# Patient Record
Sex: MALE | Race: BLACK OR AFRICAN AMERICAN | NOT HISPANIC OR LATINO | Employment: OTHER | URBAN - METROPOLITAN AREA
[De-identification: names, ages, dates, MRNs, and addresses within clinical notes are randomized per-mention and may not be internally consistent; named-entity substitution may affect disease eponyms.]

---

## 2018-01-08 ENCOUNTER — PES CALL (OUTPATIENT)
Dept: ADMINISTRATIVE | Facility: CLINIC | Age: 78
End: 2018-01-08

## 2018-02-19 DIAGNOSIS — M25.562 LEFT KNEE PAIN: Primary | ICD-10-CM

## 2018-02-28 ENCOUNTER — CLINICAL SUPPORT (OUTPATIENT)
Dept: REHABILITATION | Facility: HOSPITAL | Age: 78
End: 2018-02-28
Payer: MEDICARE

## 2018-02-28 DIAGNOSIS — M25.662 DECREASED ROM OF LEFT KNEE: ICD-10-CM

## 2018-02-28 DIAGNOSIS — M62.89 MUSCLE TIGHTNESS: ICD-10-CM

## 2018-02-28 DIAGNOSIS — M62.81 MUSCLE WEAKNESS: ICD-10-CM

## 2018-02-28 DIAGNOSIS — G89.29 CHRONIC PAIN OF LEFT KNEE: ICD-10-CM

## 2018-02-28 DIAGNOSIS — M25.562 CHRONIC PAIN OF LEFT KNEE: ICD-10-CM

## 2018-02-28 PROCEDURE — G8978 MOBILITY CURRENT STATUS: HCPCS | Mod: CL,PN

## 2018-02-28 PROCEDURE — 97161 PT EVAL LOW COMPLEX 20 MIN: CPT | Mod: PN

## 2018-02-28 PROCEDURE — G8979 MOBILITY GOAL STATUS: HCPCS | Mod: CK,PN

## 2018-02-28 PROCEDURE — 97110 THERAPEUTIC EXERCISES: CPT | Mod: PN

## 2018-02-28 NOTE — PROGRESS NOTES
"  TIME RECORD    Date: 02/28/2018    Start Time:  0830  Stop Time:  0915    OUTPATIENT PHYSICAL THERAPY   PATIENT EVALUATION  Primary Diagnosis:   1. Chronic pain of left knee     2. Muscle weakness     3. Muscle tightness     4. Decreased ROM of left knee       Treatment Diagnosis: decreased ROM of L knee, weakness of L knee, muscle tightness, L knee pain  Past Medical History:   Diagnosis Date    Cancer      Precautions: standard - hx of prostate cancer  Prior Therapy: for shoulder  Medications: Pablo Salcedo currently has no medications in their medication list.  Nutrition:  Normal  History of Present Illness: Pt reporting about 6 months of L knee pain. Pt was told he has bone on bone with some bone spurs within the knee. Pt reports gradual worsening of L knee pain without known cause of pain.   Prior Level of Function: Independent  Social History: retired - likes to go fishing, light walking  Place of Residence (Steps/Adaptations): lives with wife in a 1 story - 2 steps to enter  Functional Deficits Leading to Referral/Nature of Injury: difficulty squatting, decreased ROm of L knee, walking for long periods of time, sit to stand transitions.  Patient Therapy Goals: "more mobile with the L leg, and be able to do more activities"    Subjective     Pablo Salcedo states his pain is intermittent. Pt reports no referral pain to the leg. Pt denies being woken up from pain at night. Pt denies drop foot, change of BB control, unexplained weight gain/loss, fever, chills or night sweats. Pt reports he had an injection within his knee that gave him mild relief for a few days.       Pain:  Location: medial aspect of the L knee   Description: Aching, Throbbing and feels like it is going to give out  Activities Which Increase Pain: squatting, bending, and walking  Activities Which Decrease Pain: neoprene sleeve, ice pacl  Pain Scale: 0/10 at best 2/10 now  8/10 at worst    Objective     Observation/posture: pt in standing " with level pelvis and BLE  Knee   Right    Left   Pain/Dysfunction with Movement     AROM  PROM  MMT  AROM  PROM  MMT     Flexion  140  5 125* 129* 4*    Extension  Hyper 7  4+ Hyper 5  4*        Functional strength:    Pt able to perform full squat - mild limitations in bilateral heel cords - weight shift to R at end range      Sensation/Reflexes: grossly intact to light touch throughout BLE  Palpation: TTP over medial joint line and pes anserine region - hypertonic bands within VMO(and tenderness), vastus lateralis, and Rectus femoris  Flexibility:     HS   Mild limitations LLE - WNL RLE              Rectus femoris : positive jacob bilaterally                  hip flexors: positive ely bilaterally  Tests:            v/v : negative            ant drawer : negative                Tamara test : negative - however - palpable gentle snap or resistance of snapping in medial joint         patellar mobility : very mild limitation in medial mobility          Patellar tracking : WNL bilaterally           SLS: x5 seconds LLE, x15 second RLE - fatigue of hip flexors within last 5 seconds causing hip drop  Hip screen: ROM WNL    R L   Flex 4 4-   Abd 4+ 3+   Add 4- 3+   Ext 4- 4+  Ankle screen: ROM and strength WNL bilaterally  Gait: mild antalgic gait with initiation of ambulation - normalized after 10 feet - no AD    FOTO: 62%   Current G-Code:  CL 60-80%  Goal G-Code:  CK 40-60%    today's treatment x15 minutes:  education: educated in evaluation findings and POC.  Educated in expectations of treatment, provider's contact information (email and phone) given to pt for communication of any questions and concerns.  HEP instruction and ther ex: instructed and performed following:   Seated LAQ x10 3 second holds   HL bridging x10   HL hip adduction squeeze x10 3 second holds   SL clam x10 ea.     Assessment       Initial Assessment (Pertinent finding, problem list and factors affecting outcome):  Pablo Salcedo is  a 76 y/o male referred to outpatient PT for L knee pain. Pt presenting with decreased ROM of L knee, weakness of LLE, limitations in muscle length of BLE, and balance limitations. Pt signs and symptoms consistent with referring diagnosis likely secondary to muscle weakness and decreased NM control. Pt would benefit from skilled PT to address above stated problems, as well as, achieve pt goals within a timely manner. Pt has set realistic goals and has verbalized good understanding and agreement with reported diagnosis, prognosis and treatment. Pt demonstrates no additional cultural, spiritual or educational need and currently has no barriers to learning.    History  Co-morbidities and personal factors that may impact the plan of care Examination  Body Structures and Functions, activity limitations and participation restrictions that may impact the plan of care    Clinical Presentation   Co-morbidities:   none        Personal Factors:   no deficits Body Regions:   lower extremities  trunk    Body Systems:    gross symmetry  ROM  strength  gross coordinated movement  balance  gait  transfers            Participation Restrictions:   Domestic, recreational, community     Activity limitations:   Learning and applying knowledge  no deficits    General Tasks and Commands  no deficits    Communication  no deficits    Mobility  walking    Self care  no deficits    Domestic Life  shopping  cooking  doing house work (cleaning house, washing dishes, laundry)    Interactions/Relationships  no deficits    Life Areas  no deficits    Community and Social Life  community life  recreation and leisure         stable and uncomplicated                      low   low  moderate Decision Making/ Complexity Score:  low       Rehab Potiential: good    Short Term Goals (4 weeks)  1. Pt will demonstrate improvements in L knee ROM to 5-0-130 for ease with ambulation  2. Pt will demonstrate improvements in L knee strength to 4/5 for ease with  getting out of a chair.  3. Pt will be able to ambulate 200 ft with LRAD and without the presence of antalgic gait pattern  4. Pt will report <2/10 pain within the L knee for ease with ADL's  Long Term Goals (8 weeks)  1. Pt will demonstrate improvements in L knee ROM to 5-0-135 for ease with ambulation  2. Pt will demonstrate 4+/5 strength within the L knee for ease with house hold chores and climbing stairs  3. Pt will report being independent with his/her HEP for maintenance of improvements gained during therapy sessions  4. Pt will report <1/10 pain within the L knee for ease with ADLs  5. Pt will demonstrate ambulation x 300 ft without AD or antalgic gait.   6. Pt will score <60% limitation on the FOTO for ease with return to participation within the community      Plan     Certification Period: 2/28/18 to 5/28/18  Recommended Treatment Plan: 2 times per week for 8 weeks: Electrical Stimulation PRN, Iontophoresis (with dexamethasone PRN), Manual Therapy, Moist Heat/ Ice, Neuromuscular Re-ed, Patient Education, Therapeutic Activites, Therapeutic Exercise and Other therapeutic taping, dry needling, aquatic therapy    Other Recommendations: PTA to be involved in pt POC.        Therapist: Judi Patel, PT    I CERTIFY THE NEED FOR THESE SERVICES FURNISHED UNDER THIS PLAN OF TREATMENT AND WHILE UNDER MY CARE    Physician's comments: ________________________________________________________________________________________________________________________________________________      Physician's Name: ___________________________________

## 2023-08-10 ENCOUNTER — OFFICE VISIT (OUTPATIENT)
Dept: CARDIOLOGY | Facility: CLINIC | Age: 83
End: 2023-08-10
Payer: MEDICARE

## 2023-08-10 VITALS
WEIGHT: 198.94 LBS | HEIGHT: 68 IN | SYSTOLIC BLOOD PRESSURE: 130 MMHG | RESPIRATION RATE: 18 BRPM | HEART RATE: 70 BPM | BODY MASS INDEX: 30.15 KG/M2 | OXYGEN SATURATION: 99 % | DIASTOLIC BLOOD PRESSURE: 72 MMHG

## 2023-08-10 DIAGNOSIS — Z01.810 PREOP CARDIOVASCULAR EXAM: Primary | ICD-10-CM

## 2023-08-10 DIAGNOSIS — Z01.818 PREOPERATIVE CLEARANCE: ICD-10-CM

## 2023-08-10 PROCEDURE — 1159F PR MEDICATION LIST DOCUMENTED IN MEDICAL RECORD: ICD-10-PCS | Mod: CPTII,S$GLB,, | Performed by: INTERNAL MEDICINE

## 2023-08-10 PROCEDURE — 1101F PT FALLS ASSESS-DOCD LE1/YR: CPT | Mod: CPTII,S$GLB,, | Performed by: INTERNAL MEDICINE

## 2023-08-10 PROCEDURE — 1160F PR REVIEW ALL MEDS BY PRESCRIBER/CLIN PHARMACIST DOCUMENTED: ICD-10-PCS | Mod: CPTII,S$GLB,, | Performed by: INTERNAL MEDICINE

## 2023-08-10 PROCEDURE — 99203 PR OFFICE/OUTPT VISIT, NEW, LEVL III, 30-44 MIN: ICD-10-PCS | Mod: 25,S$GLB,, | Performed by: INTERNAL MEDICINE

## 2023-08-10 PROCEDURE — 99999 PR PBB SHADOW E&M-NEW PATIENT-LVL III: CPT | Mod: PBBFAC,,, | Performed by: INTERNAL MEDICINE

## 2023-08-10 PROCEDURE — 1125F AMNT PAIN NOTED PAIN PRSNT: CPT | Mod: CPTII,S$GLB,, | Performed by: INTERNAL MEDICINE

## 2023-08-10 PROCEDURE — 1160F RVW MEDS BY RX/DR IN RCRD: CPT | Mod: CPTII,S$GLB,, | Performed by: INTERNAL MEDICINE

## 2023-08-10 PROCEDURE — 3078F PR MOST RECENT DIASTOLIC BLOOD PRESSURE < 80 MM HG: ICD-10-PCS | Mod: CPTII,S$GLB,, | Performed by: INTERNAL MEDICINE

## 2023-08-10 PROCEDURE — 1159F MED LIST DOCD IN RCRD: CPT | Mod: CPTII,S$GLB,, | Performed by: INTERNAL MEDICINE

## 2023-08-10 PROCEDURE — 93000 ELECTROCARDIOGRAM COMPLETE: CPT | Mod: S$GLB,,, | Performed by: INTERNAL MEDICINE

## 2023-08-10 PROCEDURE — 99203 OFFICE O/P NEW LOW 30 MIN: CPT | Mod: 25,S$GLB,, | Performed by: INTERNAL MEDICINE

## 2023-08-10 PROCEDURE — 99999 PR PBB SHADOW E&M-NEW PATIENT-LVL III: ICD-10-PCS | Mod: PBBFAC,,, | Performed by: INTERNAL MEDICINE

## 2023-08-10 PROCEDURE — 3075F SYST BP GE 130 - 139MM HG: CPT | Mod: CPTII,S$GLB,, | Performed by: INTERNAL MEDICINE

## 2023-08-10 PROCEDURE — 1101F PR PT FALLS ASSESS DOC 0-1 FALLS W/OUT INJ PAST YR: ICD-10-PCS | Mod: CPTII,S$GLB,, | Performed by: INTERNAL MEDICINE

## 2023-08-10 PROCEDURE — 3078F DIAST BP <80 MM HG: CPT | Mod: CPTII,S$GLB,, | Performed by: INTERNAL MEDICINE

## 2023-08-10 PROCEDURE — 3075F PR MOST RECENT SYSTOLIC BLOOD PRESS GE 130-139MM HG: ICD-10-PCS | Mod: CPTII,S$GLB,, | Performed by: INTERNAL MEDICINE

## 2023-08-10 PROCEDURE — 3288F PR FALLS RISK ASSESSMENT DOCUMENTED: ICD-10-PCS | Mod: CPTII,S$GLB,, | Performed by: INTERNAL MEDICINE

## 2023-08-10 PROCEDURE — 1125F PR PAIN SEVERITY QUANTIFIED, PAIN PRESENT: ICD-10-PCS | Mod: CPTII,S$GLB,, | Performed by: INTERNAL MEDICINE

## 2023-08-10 PROCEDURE — 93000 EKG 12-LEAD: ICD-10-PCS | Mod: S$GLB,,, | Performed by: INTERNAL MEDICINE

## 2023-08-10 PROCEDURE — 3288F FALL RISK ASSESSMENT DOCD: CPT | Mod: CPTII,S$GLB,, | Performed by: INTERNAL MEDICINE

## 2023-08-10 RX ORDER — OXYBUTYNIN CHLORIDE 5 MG/1
TABLET ORAL
COMMUNITY
Start: 2023-06-09

## 2023-08-10 RX ORDER — MULTIVITAMIN
1 TABLET ORAL DAILY
COMMUNITY

## 2023-08-10 NOTE — PROGRESS NOTES
CARDIOVASCULAR CONSULTATION    REASON FOR CONSULT:   Pablo Salcedo is a 82 y.o. male who presents for preop CV eval.    PCP: Thao Silverman/Ortho: [facility]  HISTORY OF PRESENT ILLNESS:   The patient comes in today at the request of his orthopedic surgeon for preoperative cardiac risk stratification prior to left knee surgery.  He is most limited by left knee pain with ambulation.  He denies any angina, dyspnea, palpitations, or syncope.  There has been no PND, orthopnea, lower extremity edema.  Denies melena, hematuria, or claudication symptoms.      Family history is negative for premature CAD.      The patient is a nonsmoker.  He denies alcohol excess or illicit drug use.  He worked [employer], retired over 20 years ago.    I took the liberty of exercise the patient in the office today and he was able to climb up a flight of stairs without any cardiovascular symptoms or limitations.  He was most limited by left knee discomfort.    CARDIOVASCULAR HISTORY:   none    PAST MEDICAL HISTORY:     Past Medical History:   Diagnosis Date    Cancer        PAST SURGICAL HISTORY:     Past Surgical History:   Procedure Laterality Date    PROSTATE SURGERY         ALLERGIES AND MEDICATION:   Review of patient's allergies indicates:  No Known Allergies     Medication List      as of August 10, 2023  3:12 PM     You have not been prescribed any medications.         SOCIAL HISTORY:     Social History     Socioeconomic History    Marital status:        FAMILY HISTORY:   No family history on file.    REVIEW OF SYSTEMS:   Review of Systems   Constitutional:  Negative for chills, diaphoresis and fever.   HENT:  Negative for nosebleeds.    Eyes:  Negative for blurred vision, double vision and photophobia.   Respiratory:  Negative for hemoptysis, shortness of breath and wheezing.    Cardiovascular:  Negative for chest pain, palpitations, orthopnea, claudication, leg swelling and PND.   Gastrointestinal:  Negative for abdominal pain, blood  in stool, heartburn, melena, nausea and vomiting.   Genitourinary:  Negative for flank pain and hematuria.   Musculoskeletal:  Positive for joint pain (L knee). Negative for falls, myalgias and neck pain.   Skin:  Negative for rash.   Neurological:  Negative for dizziness, seizures, loss of consciousness, weakness and headaches.   Endo/Heme/Allergies:  Negative for polydipsia. Does not bruise/bleed easily.   Psychiatric/Behavioral:  Negative for depression and memory loss. The patient is not nervous/anxious.        PHYSICAL EXAM:   There were no vitals filed for this visit. There is no height or weight on file to calculate BMI.            Physical Exam  Vitals reviewed.   Constitutional:       General: He is not in acute distress.     Appearance: Normal appearance. He is well-developed and normal weight. He is not ill-appearing, toxic-appearing or diaphoretic.   HENT:      Head: Normocephalic and atraumatic.   Eyes:      General: No scleral icterus.     Extraocular Movements: Extraocular movements intact.      Conjunctiva/sclera: Conjunctivae normal.      Pupils: Pupils are equal, round, and reactive to light.   Neck:      Thyroid: No thyromegaly.      Vascular: Normal carotid pulses. No carotid bruit or JVD.      Trachea: Trachea normal.   Cardiovascular:      Rate and Rhythm: Normal rate and regular rhythm.      Pulses:           Carotid pulses are 2+ on the right side and 2+ on the left side.     Heart sounds: S1 normal and S2 normal. No murmur heard.     No friction rub. No gallop.   Pulmonary:      Effort: Pulmonary effort is normal. No respiratory distress.      Breath sounds: Normal breath sounds. No stridor. No wheezing, rhonchi or rales.   Chest:      Chest wall: No tenderness.   Abdominal:      General: There is no distension.      Palpations: Abdomen is soft.   Musculoskeletal:         General: No swelling or tenderness.      Cervical back: Normal range of motion and neck supple. No edema or rigidity.       Right lower leg: No edema.      Left lower leg: No edema.   Feet:      Right foot:      Skin integrity: No ulcer.      Left foot:      Skin integrity: No ulcer.   Skin:     General: Skin is warm and dry.      Coloration: Skin is not jaundiced.   Neurological:      General: No focal deficit present.      Mental Status: He is alert and oriented to person, place, and time.      Cranial Nerves: No cranial nerve deficit.   Psychiatric:         Mood and Affect: Mood normal.         Speech: Speech normal.         Behavior: Behavior normal. Behavior is cooperative.         DATA:   EKG: (personally reviewed tracing)  8/10/23 SR 72    Laboratory:  CBC:        CHEMISTRIES:  Recent Labs   Lab 06/07/23  1254   Sodium 135 L   Potassium 4.3   BUN 12.3   Creatinine 1.01   Calcium 9.1       CARDIAC BIOMARKERS:        COAGS:        LIPIDS/LFTS:        Cardiovascular Testing:  none    ASSESSMENT:   # preop CV eval prior to L knee surgery.  The patient demonstrated good exercise capacity in the office today.    PLAN:   The patient is at low cardiac risk for the planned orthopedic surgery and associated anesthesia.  No further preoperative cardiac testing is required.  RTC prn.    Manohar Faye MD, FACC

## 2025-07-31 ENCOUNTER — HOSPITAL ENCOUNTER (EMERGENCY)
Facility: HOSPITAL | Age: 85
Discharge: HOME | End: 2025-08-01
Attending: INTERNAL MEDICINE
Payer: MEDICARE
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
## 2025-07-31 DIAGNOSIS — S30.0XXA CONTUSION OF COCCYX, INITIAL ENCOUNTER: Primary | ICD-10-CM

## 2025-07-31 DIAGNOSIS — S30.0XXA CONTUSION OF SACRUM, INITIAL ENCOUNTER: ICD-10-CM

## 2025-07-31 PROCEDURE — 99284 EMERGENCY DEPT VISIT MOD MDM: CPT | Mod: 25,ER

## 2025-07-31 PROCEDURE — 25000003 PHARM REV CODE 250: Mod: ER | Performed by: INTERNAL MEDICINE

## 2025-07-31 RX ORDER — IBUPROFEN 600 MG/1
600 TABLET, FILM COATED ORAL
Status: COMPLETED | OUTPATIENT
Start: 2025-07-31 | End: 2025-07-31

## 2025-07-31 RX ORDER — ACETAMINOPHEN 500 MG
1000 TABLET ORAL
Status: COMPLETED | OUTPATIENT
Start: 2025-07-31 | End: 2025-07-31

## 2025-07-31 RX ORDER — HYDROCODONE BITARTRATE AND ACETAMINOPHEN 5; 325 MG/1; MG/1
1 TABLET ORAL EVERY 6 HOURS PRN
Qty: 10 TABLET | Refills: 0 | Status: SHIPPED | OUTPATIENT
Start: 2025-07-31

## 2025-07-31 RX ORDER — ONDANSETRON 4 MG/1
4 TABLET, ORALLY DISINTEGRATING ORAL EVERY 8 HOURS PRN
Qty: 10 TABLET | Refills: 0 | Status: SHIPPED | OUTPATIENT
Start: 2025-07-31

## 2025-07-31 RX ORDER — IBUPROFEN 600 MG/1
600 TABLET, FILM COATED ORAL EVERY 8 HOURS PRN
Qty: 30 TABLET | Refills: 0 | Status: SHIPPED | OUTPATIENT
Start: 2025-07-31

## 2025-07-31 RX ADMIN — IBUPROFEN 600 MG: 600 TABLET ORAL at 11:07

## 2025-07-31 RX ADMIN — ACETAMINOPHEN 1000 MG: 500 TABLET, FILM COATED ORAL at 11:07

## 2025-08-01 VITALS
DIASTOLIC BLOOD PRESSURE: 58 MMHG | OXYGEN SATURATION: 96 % | RESPIRATION RATE: 18 BRPM | TEMPERATURE: 98 F | HEIGHT: 68 IN | WEIGHT: 208 LBS | HEART RATE: 98 BPM | BODY MASS INDEX: 31.52 KG/M2 | SYSTOLIC BLOOD PRESSURE: 111 MMHG

## 2025-08-01 NOTE — ED PROVIDER NOTES
Encounter Date: 7/31/2025       History     Chief Complaint   Patient presents with    Fall     Pt reports pain at tailbone after fall and didn't recall the incident 1000 this morning, also reports cold symptoms started Tuesday.      84-year-old male presents to emergency department complaining of tailbone pain after slip and fall this morning.  Denies any other injuries.  Denies loss of consciousness.    The history is provided by the patient. No  was used.     Review of patient's allergies indicates:  No Known Allergies  Past Medical History:   Diagnosis Date    Cancer      Past Surgical History:   Procedure Laterality Date    PROSTATE SURGERY       No family history on file.  Social History[1]  Review of Systems   Constitutional:  Negative for chills and fever.   HENT:  Negative for facial swelling and rhinorrhea.    Respiratory:  Negative for shortness of breath.    Cardiovascular:  Negative for chest pain.   Gastrointestinal:  Negative for abdominal pain, nausea and vomiting.   Musculoskeletal:         Tailbone pain   All other systems reviewed and are negative.      Physical Exam     Initial Vitals [07/31/25 2158]   BP Pulse Resp Temp SpO2   137/76 106 18 98 °F (36.7 °C) 95 %      MAP       --         Physical Exam    Nursing note and vitals reviewed.  Constitutional: He is not diaphoretic. No distress.   HENT:   Head: Normocephalic and atraumatic.   Right Ear: External ear normal.   Left Ear: External ear normal.   Eyes: Conjunctivae are normal.   Neck:   Normal range of motion.  Cardiovascular:  Normal rate and regular rhythm.           Pulmonary/Chest: Breath sounds normal. No respiratory distress.   Abdominal: Abdomen is soft. Bowel sounds are normal.   Musculoskeletal:      Cervical back: Normal range of motion.      Comments: No lumbar region or coccyx tenderness to palpation .  Patient reports slight pain in the coccyx region upon movement/ambulation.      Neurological: He is alert  and oriented to person, place, and time. He has normal strength.   Skin: Skin is warm and dry. Capillary refill takes less than 2 seconds.   Psychiatric: He has a normal mood and affect.         ED Course   Procedures  Labs Reviewed - No data to display       Imaging Results              X-Ray Lumbar Spine Ap And Lateral (Final result)  Result time 08/01/25 00:08:30      Final result by Gina Monahan MD (08/01/25 00:08:30)                   Impression:      Age-indeterminate fracture of the L2 vertebral body.  No retropulsion.    Multilevel degenerative change.      Electronically signed by: Gina Monahan  Date:    08/01/2025  Time:    00:08               Narrative:    EXAMINATION:  LUMBAR SPINE    CLINICAL HISTORY:  Low back pain.    TECHNIQUE:  AP, lateral, and coned lateral views of the lower lumbar spine were submitted.    COMPARISON:  None.    FINDINGS:  There is normal alignment of the lumbar spine. There is height loss at the L2 vertebral level.  There is a moderate biconcave appearance of the L2 vertebral body without retropulsion, which is age indeterminate.  There is grade 1 anterolisthesis of L5 on S1.  Facet arthrosis is seen greatest at the lower lumbar spine.  Small marginal osteophytes are present.  Intervertebral disc space narrowing and vacuum phenomena are seen at L3/L4 and L4/L5.  The bones are osteopenic.                                       X-Ray Sacrum And Coccyx (Final result)  Result time 07/31/25 23:39:27      Final result by Gina Monahan MD (07/31/25 23:39:27)                   Impression:      Irregularity about the lower sacrum on the lateral view.  Age-indeterminate fracture cannot be entirely excluded.    Pubic diastasis.  Degenerative changes of the SI joints and visualized lower lumbar spine.  If persistent sacral pain,      Electronically signed by: Gina Monahan  Date:    07/31/2025  Time:    23:39               Narrative:    EXAMINATION:  XR SACRUM AND  COCCYX    CLINICAL HISTORY:  Unspecified fall, initial encounter    TECHNIQUE:  AP and lateral view of the sacrum and coccyx    COMPARISON:  None.    FINDINGS:  AP and lateral views of the sacrum and coccyx demonstrates mild irregularity about the lower sacrum on the lateral sacrococcygeal radiograph.  There is sclerosing and vacuum phenomena both SI joints consistent with degenerative changes.  There is separation of the pubic symphysis measuring up to 9 mm.  There is grade 1 anterolisthesis of L5 on S1.  Advanced facet arthrosis and vacuum phenomena seen at the visualized lower lumbar spine.  Tiny metallic densities are projecting over the pubic symphysis.                                       Medications   ibuprofen tablet 600 mg (600 mg Oral Given 7/31/25 2350)   acetaminophen tablet 1,000 mg (1,000 mg Oral Given 7/31/25 2351)     Medical Decision Making  84-year-old male presents to emergency department complaining of tailbone pain after slip and fall this morning.  Denies any other injuries.  Denies loss of consciousness.  Course of ED stay:   X-ray of lumbar spine/sacrum/coccyx was indeterminate for acute lower sacral fracture.  There was also an age indeterminate fracture of L2.  Results were discussed with the patient and he states he is able to sit upright with very little pain.  He states he would like to be discharged home with supportive care.  Instructions for lumbar/sacral fracture were given.  Prescriptions for Norco/ibuprofen/Zofran were given as well.  Patient was advised to follow up with his primary care physician within the next week for re-evaluation/return to the emergency department if condition worsens.    Amount and/or Complexity of Data Reviewed  Radiology: ordered.    Risk  OTC drugs.  Prescription drug management.                                          Clinical Impression:  Final diagnoses:  [W19.XXXA] Fall  [S30.0XXA] Contusion of coccyx, initial encounter (Primary)  [S30.0XXA]  Contusion of sacrum, initial encounter          ED Disposition Condition    Discharge Stable          ED Prescriptions       Medication Sig Dispense Start Date End Date Auth. Provider    HYDROcodone-acetaminophen (NORCO) 5-325 mg per tablet Take 1 tablet by mouth every 6 (six) hours as needed for Pain. 10 tablet 7/31/2025 -- Librado Pérez MD    ondansetron (ZOFRAN-ODT) 4 MG TbDL Take 1 tablet (4 mg total) by mouth every 8 (eight) hours as needed (Nausea). 10 tablet 7/31/2025 -- Librado Pérez MD    ibuprofen (ADVIL,MOTRIN) 600 MG tablet Take 1 tablet (600 mg total) by mouth every 8 (eight) hours as needed for Pain. 30 tablet 7/31/2025 -- Librado Pérez MD          Follow-up Information       Follow up With Specialties Details Why Contact Info    Gracie Osuna MD Internal Medicine Schedule an appointment as soon as possible for a visit in 1 week For reevaluation [address] [facility]  [address]  [facility] [address]  [phone]                     [1]         Librado Pérez MD  08/01/25 0057